# Patient Record
Sex: FEMALE | URBAN - METROPOLITAN AREA
[De-identification: names, ages, dates, MRNs, and addresses within clinical notes are randomized per-mention and may not be internally consistent; named-entity substitution may affect disease eponyms.]

---

## 2021-04-09 ENCOUNTER — IMPORTED ENCOUNTER (OUTPATIENT)
Dept: URBAN - METROPOLITAN AREA CLINIC 31 | Facility: CLINIC | Age: 76
End: 2021-04-09

## 2021-04-09 PROBLEM — H25.813: Noted: 2021-04-09

## 2021-04-09 PROCEDURE — 92004 COMPRE OPH EXAM NEW PT 1/>: CPT

## 2021-04-09 PROCEDURE — 92015 DETERMINE REFRACTIVE STATE: CPT

## 2021-04-09 NOTE — PATIENT DISCUSSION
1.  Discussed the risks benefits alternatives and limitations of cataract surgery including infection bleeding loss of vision retinal tears detachment. The patient stated a full understanding and a desire to proceed with the procedure in both eyes. Refractive options were reviewed. Patient has elected to be optimized for distance vision in both eyes. The patient will still need glasses for reading and to possibly fine tune distance vision. Schedule KP/ IOL OS/OD2. Return for an appointment for [address] [place] [address] with Dr. Cole Sosa.

## 2021-04-30 ENCOUNTER — IMPORTED ENCOUNTER (OUTPATIENT)
Dept: URBAN - METROPOLITAN AREA CLINIC 31 | Facility: CLINIC | Age: 76
End: 2021-04-30

## 2021-04-30 PROBLEM — H25.813: Noted: 2021-04-30

## 2021-04-30 PROCEDURE — 92136 OPHTHALMIC BIOMETRY: CPT

## 2022-04-02 ASSESSMENT — TONOMETRY
OD_IOP_MMHG: 18
OS_IOP_MMHG: 18

## 2022-04-02 ASSESSMENT — VISUAL ACUITY
OS_SC: 20/200
OS_GLARE: 20/100
OS_CC: 20/50
OD_SC: 20/200
OD_GLARE: 20/100
OD_CC: 20/50